# Patient Record
Sex: MALE | Race: BLACK OR AFRICAN AMERICAN | NOT HISPANIC OR LATINO | Employment: UNEMPLOYED | URBAN - METROPOLITAN AREA
[De-identification: names, ages, dates, MRNs, and addresses within clinical notes are randomized per-mention and may not be internally consistent; named-entity substitution may affect disease eponyms.]

---

## 2025-01-12 ENCOUNTER — HOSPITAL ENCOUNTER (EMERGENCY)
Facility: HOSPITAL | Age: 25
Discharge: HOME | End: 2025-01-12

## 2025-01-12 ENCOUNTER — APPOINTMENT (OUTPATIENT)
Dept: RADIOLOGY | Facility: HOSPITAL | Age: 25
End: 2025-01-12

## 2025-01-12 VITALS
HEART RATE: 68 BPM | OXYGEN SATURATION: 99 % | HEIGHT: 72 IN | TEMPERATURE: 98.8 F | BODY MASS INDEX: 24.38 KG/M2 | SYSTOLIC BLOOD PRESSURE: 124 MMHG | DIASTOLIC BLOOD PRESSURE: 72 MMHG | WEIGHT: 180 LBS | RESPIRATION RATE: 16 BRPM

## 2025-01-12 DIAGNOSIS — R05.1 ACUTE COUGH: Primary | ICD-10-CM

## 2025-01-12 PROCEDURE — 71046 X-RAY EXAM CHEST 2 VIEWS: CPT | Mod: FOREIGN READ | Performed by: RADIOLOGY

## 2025-01-12 PROCEDURE — 99283 EMERGENCY DEPT VISIT LOW MDM: CPT | Mod: 25

## 2025-01-12 PROCEDURE — 71046 X-RAY EXAM CHEST 2 VIEWS: CPT

## 2025-01-12 ASSESSMENT — COLUMBIA-SUICIDE SEVERITY RATING SCALE - C-SSRS
1. IN THE PAST MONTH, HAVE YOU WISHED YOU WERE DEAD OR WISHED YOU COULD GO TO SLEEP AND NOT WAKE UP?: NO
2. HAVE YOU ACTUALLY HAD ANY THOUGHTS OF KILLING YOURSELF?: NO
6. HAVE YOU EVER DONE ANYTHING, STARTED TO DO ANYTHING, OR PREPARED TO DO ANYTHING TO END YOUR LIFE?: NO

## 2025-01-12 NOTE — ED PROVIDER NOTES
HPI   Chief Complaint   Patient presents with    Blood in mucous       HPI    This is a 24 year old male with no significant past medical history presents to the ED stating that when he coughs he has blood in his sputum.   He has no fever, chills, nausea, vomiting, sinus issues that he knows of and smokes weed regularly.   Denies chest pain and dyspnea.     Patient History   No past medical history on file.  No past surgical history on file.  No family history on file.  Social History     Tobacco Use    Smoking status: Not on file    Smokeless tobacco: Not on file   Substance Use Topics    Alcohol use: Not on file    Drug use: Not on file       Physical Exam   ED Triage Vitals [01/12/25 1301]   Temperature Heart Rate Respirations BP   37.1 °C (98.8 °F) 68 16 124/72      Pulse Ox Temp Source Heart Rate Source Patient Position   99 % Tympanic Monitor Sitting      BP Location FiO2 (%)     Left arm --       Physical Exam  Constitutional:       Appearance: Normal appearance.   HENT:      Head: Normocephalic and atraumatic.      Mouth/Throat:      Mouth: Mucous membranes are moist.   Eyes:      Extraocular Movements: Extraocular movements intact.      Pupils: Pupils are equal, round, and reactive to light.   Cardiovascular:      Rate and Rhythm: Normal rate and regular rhythm.   Pulmonary:      Effort: Pulmonary effort is normal. No respiratory distress.      Breath sounds: Normal breath sounds. No stridor. No wheezing or rhonchi.   Abdominal:      Palpations: Abdomen is soft.   Musculoskeletal:         General: Normal range of motion.      Cervical back: Normal range of motion and neck supple.   Skin:     General: Skin is warm and dry.   Neurological:      General: No focal deficit present.      Mental Status: He is alert and oriented to person, place, and time.   Psychiatric:         Mood and Affect: Mood normal.         Behavior: Behavior normal.           ED Course & MDM   Diagnoses as of 01/12/25 1400   Acute cough                  No data recorded     [place] Coma Scale Score: 15 (01/12/25 1301 : Prince Carmen IV, RN)                           Medical Decision Making  This is a 24 year old male with no significant past medical history presents to the ED stating that when he coughs he has blood in his sputum.   He has no fever, chills, nausea, vomiting, sinus issues that he knows of and smokes weed regularly.   Denies chest pain and dyspnea.   On exam, his breath sounds were clear, Vital signs showed no tachycardia, tachypnea or hypoxia. No recent travel and no known sick contact.   Per triage nurse, his mother wanted him to come in to get a chest x-ray.   His chest x-ray showed no acute abnormalities and this was reviewed with the patient. I also put in a referral to a primary care so further work up can be done if needed.         Procedure  Procedures     Nallely Chaves, JAH-CNP, DNP  01/12/25 4923

## 2025-01-12 NOTE — ED TRIAGE NOTES
Pt presents to the Ed for complaints of blood in his mucous that started on Friday. Pt sates in the morning when he wakes up he has a lot of mucous in his throat and states when he brings it up it can be bloody at times. Pt states he wants to get an X-ray to make sure everything is ok. Pt complains of no pain at this time. Pt also has no PMHX